# Patient Record
Sex: FEMALE | Race: WHITE | NOT HISPANIC OR LATINO | Employment: PART TIME | URBAN - METROPOLITAN AREA
[De-identification: names, ages, dates, MRNs, and addresses within clinical notes are randomized per-mention and may not be internally consistent; named-entity substitution may affect disease eponyms.]

---

## 2019-10-23 ENCOUNTER — LACTATION CONSULT (OUTPATIENT)
Dept: OBSTETRICS AND GYNECOLOGY | Facility: CLINIC | Age: 24
End: 2019-10-23
Payer: COMMERCIAL

## 2019-10-23 DIAGNOSIS — O92.79 POOR LATCH ON, POSTPARTUM: Primary | ICD-10-CM

## 2019-10-23 NOTE — PATIENT INSTRUCTIONS
"Recommended Interventions and Plan of Care for Padma & Julianne      Breastfeed baby  on cue until content at least 8-12 times in 24hrs.   Cluster feeds every 1-2hrs are common.    Use the asymmetric latch as demonstrated during the consult.   Go to www.[email or web address].ca and www.[employer].com - search [employer] "Attaching your baby @ the breast" to view at home.    Use breast compression during pauses in sucking as demonstrated during the consult.    Observe for signs of milk transfer to baby:   wide pauses in the sucks   swallows throughout  the feedings   milk on the babys lips when removed from the breast   wet nipple as it comes out of the babys mouth   heavy breasts before a feeding and softer breasts after the feeding    Pump with your double medela pump for 10-30 min after 1 morning feeding session for storage in preparation for your return to work.     Treat engorgement:  use warmth for 10-15 min. with massage before every    feeding, soften the front of the breasts by expressing a small amount of milk    before latch attempts, latch baby onto breasts and nurse until content, use an  ice pack wrapped in a thin towel/pillow case  on breasts for 20min after every feeding.  Repeat with the babys cues or every 2hrs by waking baby until resolved.    Treat routine sore nipples:  correct positioning and latch on, break suction when baby removed from breast, rub expressed breastmilk  and/or lanolin into nipple after every feeding, begin feeding on least sore  nipple, use different positions.    Count and record the number of feedings, urine diapers, and dirty diapers every 24hrs.    Clean all breastfeeding aids with warm soapy water after each use and sterilize each day.    Look into scale for home usage if that heriberto make you feel better to know what your baby is getting at the breast. No medical need but may make you feel more comfortable. You can rent these as well as purchase.     "

## 2020-12-06 ENCOUNTER — OFFICE VISIT (OUTPATIENT)
Dept: URGENT CARE | Facility: CLINIC | Age: 25
End: 2020-12-06
Payer: COMMERCIAL

## 2020-12-06 VITALS
OXYGEN SATURATION: 99 % | DIASTOLIC BLOOD PRESSURE: 84 MMHG | HEIGHT: 65 IN | WEIGHT: 169 LBS | BODY MASS INDEX: 28.16 KG/M2 | SYSTOLIC BLOOD PRESSURE: 123 MMHG | HEART RATE: 88 BPM | RESPIRATION RATE: 17 BRPM | TEMPERATURE: 99 F

## 2020-12-06 DIAGNOSIS — J01.40 ACUTE NON-RECURRENT PANSINUSITIS: ICD-10-CM

## 2020-12-06 DIAGNOSIS — Z20.822 CLOSE EXPOSURE TO COVID-19 VIRUS: Primary | ICD-10-CM

## 2020-12-06 DIAGNOSIS — J02.9 ACUTE PHARYNGITIS, UNSPECIFIED ETIOLOGY: ICD-10-CM

## 2020-12-06 LAB
CTP QC/QA: YES
SARS-COV-2 RDRP RESP QL NAA+PROBE: NEGATIVE

## 2020-12-06 PROCEDURE — 3008F PR BODY MASS INDEX (BMI) DOCUMENTED: ICD-10-PCS | Mod: CPTII,S$GLB,, | Performed by: FAMILY MEDICINE

## 2020-12-06 PROCEDURE — U0002: ICD-10-PCS | Mod: QW,S$GLB,, | Performed by: FAMILY MEDICINE

## 2020-12-06 PROCEDURE — 3008F BODY MASS INDEX DOCD: CPT | Mod: CPTII,S$GLB,, | Performed by: FAMILY MEDICINE

## 2020-12-06 PROCEDURE — 99203 PR OFFICE/OUTPT VISIT, NEW, LEVL III, 30-44 MIN: ICD-10-PCS | Mod: S$GLB,,, | Performed by: FAMILY MEDICINE

## 2020-12-06 PROCEDURE — U0002 COVID-19 LAB TEST NON-CDC: HCPCS | Mod: QW,S$GLB,, | Performed by: FAMILY MEDICINE

## 2020-12-06 PROCEDURE — 99203 OFFICE O/P NEW LOW 30 MIN: CPT | Mod: S$GLB,,, | Performed by: FAMILY MEDICINE

## 2020-12-06 RX ORDER — DEXCHLORPHENIRAMINE MALEATE AND PSEUDOEPHEDRINE HYDROCHLORIDE 2; 60 MG/1; MG/1
1 TABLET, MULTILAYER ORAL 2 TIMES DAILY PRN
Qty: 20 TABLET | Refills: 0 | Status: SHIPPED | OUTPATIENT
Start: 2020-12-06

## 2020-12-06 RX ORDER — CEFDINIR 300 MG/1
300 CAPSULE ORAL 2 TIMES DAILY
Qty: 20 CAPSULE | Refills: 0 | Status: SHIPPED | OUTPATIENT
Start: 2020-12-06 | End: 2020-12-16

## 2020-12-06 RX ORDER — NAPROXEN 375 MG/1
375 TABLET ORAL 2 TIMES DAILY
Qty: 20 TABLET | Refills: 0 | Status: SHIPPED | OUTPATIENT
Start: 2020-12-06

## 2020-12-06 NOTE — PROGRESS NOTES
"Subjective:       Patient ID: Padma Ohara is a 25 y.o. female.    Vitals:  height is 5' 5" (1.651 m) and weight is 76.7 kg (169 lb). Her temperature is 99 °F (37.2 °C). Her blood pressure is 123/84 and her pulse is 88. Her respiration is 17 and oxygen saturation is 99%.     Chief Complaint: Cough    Cough  This is a new problem. Episode onset: 12/04/2020. The problem has been unchanged. The cough is non-productive. Associated symptoms include headaches and postnasal drip. Pertinent negatives include no chills, ear pain, eye redness, fever, hemoptysis, myalgias, rash, sore throat, shortness of breath or wheezing. She has tried nothing for the symptoms. There is no history of asthma, bronchiectasis, bronchitis, COPD, emphysema, environmental allergies or pneumonia.       Constitution: Negative for activity change, appetite change, chills, sweating, fatigue and fever.        Close exposure to COVID  Loss of taste/smell x2 days   HENT: Positive for postnasal drip. Negative for ear pain, congestion, sinus pain, sinus pressure, sore throat and voice change.    Neck: Negative for painful lymph nodes.   Eyes: Negative for eye redness.   Respiratory: Positive for cough. Negative for chest tightness, sputum production, bloody sputum, COPD, shortness of breath, stridor, wheezing and asthma.    Gastrointestinal: Positive for nausea and diarrhea. Negative for abdominal pain and vomiting.   Musculoskeletal: Negative for muscle ache.   Skin: Negative for rash.   Allergic/Immunologic: Negative for environmental allergies, seasonal allergies, asthma, immunizations up-to-date and flu shot.   Neurological: Positive for headaches.   Hematologic/Lymphatic: Negative for swollen lymph nodes.       Objective:      Physical Exam   Constitutional: She is oriented to person, place, and time. She appears well-developed. She is cooperative.  Non-toxic appearance. She does not appear ill. No distress.   HENT:   Head: Normocephalic and " atraumatic.   Ears:   Right Ear: Hearing, tympanic membrane, external ear and ear canal normal.   Left Ear: Hearing, tympanic membrane, external ear and ear canal normal.   Nose: No mucosal edema, rhinorrhea or nasal deformity. No epistaxis. Right sinus exhibits maxillary sinus tenderness and frontal sinus tenderness. Left sinus exhibits maxillary sinus tenderness and frontal sinus tenderness.   Mouth/Throat: Uvula is midline and mucous membranes are normal. No trismus in the jaw. Normal dentition. No uvula swelling. Posterior oropharyngeal edema and posterior oropharyngeal erythema present. No oropharyngeal exudate.   Eyes: Conjunctivae and lids are normal. No scleral icterus.   Neck: Trachea normal, full passive range of motion without pain and phonation normal. Neck supple. No neck rigidity. No edema and no erythema present.   Cardiovascular: Normal rate, regular rhythm, normal heart sounds and normal pulses.   Pulmonary/Chest: Effort normal and breath sounds normal. No respiratory distress. She has no decreased breath sounds. She has no rhonchi.   Abdominal: Normal appearance.   Musculoskeletal: Normal range of motion.         General: No deformity.   Neurological: She is alert and oriented to person, place, and time. She exhibits normal muscle tone. Coordination normal.   Skin: Skin is warm, dry, intact, not diaphoretic and not pale. Psychiatric: Her speech is normal and behavior is normal. Judgment and thought content normal.   Nursing note and vitals reviewed.    Results for orders placed or performed in visit on 12/06/20   POCT COVID-19 Rapid Screening   Result Value Ref Range    POC Rapid COVID Negative Negative     Acceptable Yes            Assessment:       1. Close exposure to COVID-19 virus    2. Acute pharyngitis, unspecified etiology    3. Acute non-recurrent pansinusitis        Plan:         Close exposure to COVID-19 virus  -     POCT COVID-19 Rapid Screening    Acute pharyngitis,  unspecified etiology    Acute non-recurrent pansinusitis  -     cefdinir (OMNICEF) 300 MG capsule; Take 1 capsule (300 mg total) by mouth 2 (two) times daily. for 10 days  Dispense: 20 capsule; Refill: 0  -     dexchlorpheniramin-pseudoephed (RESCON) 2-60 mg Tab; Take 1 tablet by mouth 2 (two) times daily as needed.  Dispense: 20 tablet; Refill: 0  -     naproxen (NAPROSYN) 375 MG tablet; Take 1 tablet (375 mg total) by mouth 2 (two) times daily.  Dispense: 20 tablet; Refill: 0     Please drink plenty of fluids.  Please get plenty of rest.  Please return here or go to the Emergency Department for any concerns or worsening of condition.  If you were given wait & see antibiotics, please wait 3-5 days before taking them, and only take them if your symptoms have worsened or not improved.  If you do begin taking the antibiotics, please take them to completion.  If you were prescribed antibiotics, please take them to completion.  If you were prescribed a narcotic medication, do not drive or operate heavy equipment or machinery while taking these medications.    You were given a decongestant (RESCON or POLY VENT Dm).  If your insurance does not cover it or you cannot afford it, it is ok to use the over the counter products listed below.  If you do not have Hypertension or any history of palpitations, it is ok to take over the counter Sudafed or Mucinex D or Allegra-D or Claritin-D or Zyrtec-D.  If you do take one of the above, it is ok to combine that with plain over the counter Mucinex or Allegra or Claritin or Zyrtec.  If for example you are taking Zyrtec -D, you can combine that with Mucinex, but not Mucinex-D.  If you are taking Mucinex-D, you can combine that with plain Allegra or Claritin or Zyrtec.   If you do have Hypertension or palpitations, it is safe to take Coricidin HBP for relief of sinus symptoms.    We recommend you take over the counter Flonase (Fluticasone) or another nasally inhaled steroid unless you  are already taking one.  Nasal irrigation with a saline spray or Netti Pot like device per their directions is also recommended.  If not allergic, please take over the counter Tylenol (Acetaminophen) and/or Motrin (Ibuprofen) as directed for control of pain and/or fever.    Robitussin DM 2 teas every 4 hours as needed for cough.  If you  smoke, please stop smoking.    Please follow up with your primary care doctor or specialist as needed.  Herminio Mohr MD  [phone]    You must understand that you have received treatment at an Urgent Care facility only, and that you may be  released before all of your medical problems are known or treated. Urgent Care facilities are not equipped to  handle life threatening emergencies. It is recommended that you seek care at an Emergency Department for  further evaluation of worsening or concerning symptoms, or possibly life threatening conditions as  discussed.

## 2020-12-06 NOTE — PATIENT INSTRUCTIONS
Please drink plenty of fluids.  Please get plenty of rest.  Please return here or go to the Emergency Department for any concerns or worsening of condition.  If you were given wait & see antibiotics, please wait 3-5 days before taking them, and only take them if your symptoms have worsened or not improved.  If you do begin taking the antibiotics, please take them to completion.  If you were prescribed antibiotics, please take them to completion.  If you were prescribed a narcotic medication, do not drive or operate heavy equipment or machinery while taking these medications.    You were given a decongestant (RESCON or POLY VENT Dm).  If your insurance does not cover it or you cannot afford it, it is ok to use the over the counter products listed below.  If you do not have Hypertension or any history of palpitations, it is ok to take over the counter Sudafed or Mucinex D or Allegra-D or Claritin-D or Zyrtec-D.  If you do take one of the above, it is ok to combine that with plain over the counter Mucinex or Allegra or Claritin or Zyrtec.  If for example you are taking Zyrtec -D, you can combine that with Mucinex, but not Mucinex-D.  If you are taking Mucinex-D, you can combine that with plain Allegra or Claritin or Zyrtec.   If you do have Hypertension or palpitations, it is safe to take Coricidin HBP for relief of sinus symptoms.    We recommend you take over the counter Flonase (Fluticasone) or another nasally inhaled steroid unless you are already taking one.  Nasal irrigation with a saline spray or Netti Pot like device per their directions is also recommended.  If not allergic, please take over the counter Tylenol (Acetaminophen) and/or Motrin (Ibuprofen) as directed for control of pain and/or fever.    Robitussin DM 2 teas every 4 hours as needed for cough.  If you  smoke, please stop smoking.    Please follow up with your primary care doctor or specialist as needed.  Herminio Mohr MD  [phone]    You  must understand that you have received treatment at an Urgent Care facility only, and that you may be  released before all of your medical problems are known or treated. Urgent Care facilities are not equipped to  handle life threatening emergencies. It is recommended that you seek care at an Emergency Department for  further evaluation of worsening or concerning symptoms, or possibly life threatening conditions as  discussed.    Pharyngitis: Strep (Presumed)    You have pharyngitis (sore throat). The cause is thought to be the streptococcus, or strep, bacterium. Strep throat infection can cause throat pain that is worse when swallowing, aching all over, headache, and fever. The infection may be spread by coughing, kissing, or touching others after touching your mouth or nose. Antibiotic medications are given to treat the infection.  Home care  · Rest at home. Drink plenty of fluids to avoid dehydration.  · No work or school for the first 2 days of taking the antibiotics. After this time, you will not be contagious. You can then return to work or school if you are feeling better.   · The antibiotic medication must be taken for the full 10 days, even if you feel better. This is very important to ensure the infection is treated. It is also important to prevent drug-resistant organisms from developing. If you were given an antibiotic shot, no more antibiotics are needed.  · You may use acetaminophen or ibuprofen to control pain or fever, unless another medicine was prescribed for this. If you have chronic liver or kidney disease or ever had a stomach ulcer or GI bleeding, talk with your doctor before using these medicines.  · Throat lozenges or a throat-numbing sprays can help reduce throat pain. Gargling with warm salt water can also help. Dissolve 1/2 teaspoon of salt in 1 8 ounce glass of warm water.   · Avoid salty or spicy foods, which can irritate the throat.  Follow-up care  Follow up with your healthcare  provider or our staff if you are not improving over the next week.  When to seek medical advice  Call your healthcare provider right away if any of these occur:  · Fever as directed by your doctor.   · New or worsening ear pain, sinus pain, or headache  · Painful lumps in the back of neck  · Stiff neck  · Lymph nodes are getting larger  · Inability to swallow liquids, excessive drooling, or inability to open mouth wide due to throat pain  · Signs of dehydration (very dark urine or no urine, sunken eyes, dizziness)  · Trouble breathing or noisy breathing  · Muffled voice  · New rash  Date Last Reviewed: 4/13/2015 © 2000-2016 [employer]. [address] [place] [address], [place] [address]. All rights reserved. This information is not intended as a substitute for professional medical care. Always follow your healthcare professional's instructions.      Acute Bacterial Rhinosinusitis (ABRS)  Acute bacterial rhinosinusitis (ABRS) is an infection of your nasal cavity and sinuses. Its caused by bacteria. Acute means that youve had symptoms for less than 12 weeks.  Understanding your sinuses  The nasal cavity is the large air-filled space behind your nose. The sinuses are a group of spaces formed by the bones of your face. They connect with your nasal cavity. ABRS causes the tissue lining these spaces to become inflamed. Mucus may not drain normally. This leads to facial pain and other symptoms.  What causes ABRS?  ABRS most often follows an upper respiratory infection caused by a virus. Bacteria then infect the lining of your nasal cavity and sinuses. But you can also get ABRS if you have:  · Nasal allergies  · Long-term nasal swelling and congestion not caused by allergies  · Blockage in the nose  Symptoms of ABRS  The symptoms of ABRS may be different for each person, and can include:  · Nasal congestion  · Runny nose  · Fluid draining from the nose down the throat (postnasal drip)  · Headache  · Cough  · Pain in  the sinuses  · Thick, colored fluid from the nose (mucus)  · Fever  Diagnosing ABRS  ABRS may be diagnosed if youve had an upper respiratory infection like a cold and cough for longer than 10 to 14 days. Your health care provider will ask about your symptoms and your medical history. The provider will check your vital signs, including your temperature. Youll have a physical exam. The health care provider will check your ears, nose, and throat. You likely wont need any tests. If ABRS comes back, you may have a culture or other tests.  Treatment for ABRS  Treatment may include:  · Antibiotic medicine. This is for symptoms that last for at least 10 to 14 days.  · Nasal corticosteroid medicine. Drops or spray used in the nose can lessen swelling and congestion.  · Over-the-counter pain medicine. This is to lessen sinus pain and pressure.  · Nasal decongestant medicine. Spray or drops may help to lessen congestion. Do not use them for more than a few days.  · Salt wash (saline irrigation). This can help to loosen mucus.  Possible complications of ABRS  ABRS may come back or become long-term (chronic).  In rare cases, ABRS may cause complications such as:   · Inflamed tissue around the brain and spinal cord (meningitis)  · Inflamed tissue around the eyes (orbital cellulitis)  · Inflamed bones around the sinuses (osteitis)  These problems may need to be treated in a hospital with intravenous (IV) antibiotic medicine or surgery.  When to call the health care provider  Call your health care provider if you have any of the following:  · Symptoms that dont get better, or get worse  · Symptoms that dont get better after 3 to 5 days on antibiotics  · Trouble seeing  · Swelling around your eyes  · Confusion or trouble staying awake   Date Last Reviewed: 3/3/2015  © 0120-5322 [facility]. [address] [place] [address], [place] [address]. All rights reserved. This information is not intended as a substitute for  professional medical care. Always follow your healthcare professional's instructions.

## 2020-12-06 NOTE — LETTER
December 6, 2020  Padma Ohara  [address] [address]                [facility]  [address], [address]  [place] [address]  Phone: [phone]  Fax: [phone] Padma Ohara was seen and treated in our Urgent Care department on 12/6/2020. She may return to work in 2 - 3 days.      If you have any questions or concerns, please don't hesitate to call.        Sincerely,        Talat Desir MD

## 2021-11-08 NOTE — LACTATION NOTE
Mother- Baby Intake Form    Mother's name: Padma Ohara  Date: 10/23/19     OB/GYN: Kody  : [birth year] Age: 24    Number of pregnancies: 2  Live Births: 1  Type of Delivery: Vaginal    Any Complications? None    1. List any medications that you are currently taking: PNV      2. Do you have any health problems such as Thyroid, High Blood Pressure, High Blood Sugar, or PCOS? None      3. Did you lose more than normal amount of blood at delivery? No      4. Do you have  History of anxiety or depressions? No      Have you been clinically treated for this? NA    5. Have you had any breast or weight loss surgeries? No      Baby's Name:   Julianne Ohara   : 10[birth year]  Age: 1 week  Pediatrician:  Yenny  Gestational Age: 40      Birth Weight:  8#0.7 oz     Discharge Weight:  7#12     List other weights and when they were taken:  Date:  10/21/19 Weight:  7#12 @ MD office    List any health problems your baby had: None    List any medications your baby is taking: Vit D supplement    IN THE PAST 24 HOURS:  How many times has your baby gone to breast, and fed?       12    Attempts?   12     Approximate length of feeding times:  60 mins  Are you offering one breast or two per feeding? 2  How many times have you pumped in the last 24 hours?   1     After breastfeedin        In place of breastfeedin         What type of pump are you using? HaaKaa   How much of the pumped milk was fed to your baby in the 24hrs? none   How much formula was fed to your baby in the last 24hrs? none   Type of formula: NA     How many wet diapers / 24 hours? 10    Number/Color of bowel movements in 24 hours? 8 yellow     LACTATION CONSULT-WORKSHEET    DATE: 10/23/19    Reason for visit: Mom calls for latch help  Last weight: 7#12oz @ MD 10/21  Todays weight: 7#12.9oz naked here    TIME OF END OF LAST BREASTFEEDING:   10am    BREASTFEEDIN    WEIGHT BEFORE FEEDIN  WEIGHT AFTER L BREAST   3678     WEIGHT GAIN    +44   WEIGHT  "AFTER R BREAST  3706      WEIGHT GAIN                       +28         TOTAL BF INTAKE:  +72     PUMPING:  TOTAL PUMPED VOLUME: 45     TIME AT END OF PUMPIN    ESTIMATED MATERNAL MILK YIELD/HR/DAY:  TOTAL BREAST MILK (FEED & PUMP)             117 cc  DIVIDE BY #HRS SINCE LAST FEED   4.25  hrs                = 27.5 cc/hr                            X 24 HRS    = 660.70 cc/24hrs                           =  22 OZ/DAY       INFANT NEEDS 21.2 OZ/DAY    MATERNAL MILK PRODUCED  22 Oz/day     LACTATION NOTE: Mom here today with 1 week old Julianne for help with breastfeeding. Mom begins crying as soon as she sits in chair. States she feels overwhelmed with inability to latch correctly and concerned baby is not feeding well. Baby with yellow to face and scleras. Chest mildly yellow but none to abdomen or legs noted. No molding or bruising noted. Bili scan @ 15.1 today.  Bili tool shows low intermediate risk and level could be 18 if medium risk or 21 if low risk baby. Term 40 week baby. Discussed most important thing is good feeds for increasing fluids leading to frequent stools and voids for body to get rid of bilirubin. Oral assessment reveals a thin lip tie that goes around alveolar ridge and a "notch" is noted in the top gum line. Easily able to flange upper lip without blanching noted. Tongue extrusion and lateral movements noted but posterior tie noted that blanches when baby cries and tongue lifts upward toward palate. LER handout shown to mom and explained. To breast. Placed in exaggerated football/clutch hold and baby latches with audible swallows on second attempt. Mom relieved. No clicking.popping, or audible sounds heard other than swallows. Baby self- detaches and mom latches to other side on her own. Baby stays on first try with swallows. Transfers 72ml this feeding session. Mom states baby usually comes on & off frequently but that her positioning had the baby in a more sitting up position. Explained " differences and had mom practice positioning several times until she felt comfortable on both sides. Pumps for 10 minutes after with 45ml obtained, labeled, and sent home with parents. AVS instructions reviewed at length and mom to call idf desires more help. Mom states would like to see OT/Speech Language for second opinion on tongue and lip tie for evaluation. Resource list shown and mom okays call to [employer] for evaluation. Hand out and phone numbers provided.     Consult time started:1300  Consult time ended: 1445     No temperature has been documented for this patient in CPN or on the OB Flowsheet. Ensure the highest temperature during labor was documented on the OB Flowsheet.  No gestational age at birth has been documented. Ensure delivery date/time has been entered above.  Rupture of membranes must be entered above.   EOS calculated successfully. EOS Risk Factor: 0.5/1000 live births ([facility] national incidence); GA=39w6d; Temp=100.8; ROM=4.2; GBS='Negative'; Antibiotics='No antibiotics or any antibiotics < 2 hrs prior to birth'